# Patient Record
Sex: FEMALE | Race: WHITE | Employment: FULL TIME | URBAN - METROPOLITAN AREA
[De-identification: names, ages, dates, MRNs, and addresses within clinical notes are randomized per-mention and may not be internally consistent; named-entity substitution may affect disease eponyms.]

---

## 2023-09-08 ENCOUNTER — OFFICE VISIT (OUTPATIENT)
Age: 53
End: 2023-09-08
Payer: COMMERCIAL

## 2023-09-08 VITALS
BODY MASS INDEX: 26.44 KG/M2 | HEART RATE: 68 BPM | DIASTOLIC BLOOD PRESSURE: 75 MMHG | OXYGEN SATURATION: 99 % | HEIGHT: 63 IN | WEIGHT: 149.2 LBS | TEMPERATURE: 97 F | RESPIRATION RATE: 18 BRPM | SYSTOLIC BLOOD PRESSURE: 121 MMHG

## 2023-09-08 DIAGNOSIS — J30.1 ALLERGIC RHINITIS DUE TO POLLEN, UNSPECIFIED SEASONALITY: ICD-10-CM

## 2023-09-08 DIAGNOSIS — E78.00 PURE HYPERCHOLESTEROLEMIA: Primary | ICD-10-CM

## 2023-09-08 DIAGNOSIS — Z86.19 H/O COLD SORES: ICD-10-CM

## 2023-09-08 DIAGNOSIS — Z12.11 COLON CANCER SCREENING: ICD-10-CM

## 2023-09-08 DIAGNOSIS — N95.1 MENOPAUSAL SYMPTOMS: ICD-10-CM

## 2023-09-08 PROCEDURE — 99203 OFFICE O/P NEW LOW 30 MIN: CPT | Performed by: INTERNAL MEDICINE

## 2023-09-08 RX ORDER — M-VIT,TX,IRON,MINS/CALC/FOLIC 27MG-0.4MG
1 TABLET ORAL DAILY
COMMUNITY

## 2023-09-08 RX ORDER — VIT C/B6/B5/MAGNESIUM/HERB 173 50-5-6-5MG
CAPSULE ORAL DAILY
COMMUNITY

## 2023-09-08 RX ORDER — VALACYCLOVIR HYDROCHLORIDE 1 G/1
2000 TABLET, FILM COATED ORAL 2 TIMES DAILY PRN
Qty: 24 TABLET | Refills: 0 | Status: SHIPPED | OUTPATIENT
Start: 2023-09-08 | End: 2023-09-18

## 2023-09-08 RX ORDER — FEXOFENADINE HCL 180 MG/1
TABLET ORAL DAILY PRN
COMMUNITY
Start: 2023-09-07

## 2023-09-08 RX ORDER — FLUTICASONE PROPIONATE 50 MCG
1 SPRAY, SUSPENSION (ML) NASAL DAILY
COMMUNITY

## 2023-09-08 RX ORDER — CHOLECALCIFEROL (VITAMIN D3) 125 MCG
500 CAPSULE ORAL DAILY
COMMUNITY

## 2023-09-08 SDOH — HEALTH STABILITY: PHYSICAL HEALTH: ON AVERAGE, HOW MANY DAYS PER WEEK DO YOU ENGAGE IN MODERATE TO STRENUOUS EXERCISE (LIKE A BRISK WALK)?: 7 DAYS

## 2023-09-08 SDOH — HEALTH STABILITY: PHYSICAL HEALTH: ON AVERAGE, HOW MANY MINUTES DO YOU ENGAGE IN EXERCISE AT THIS LEVEL?: 60 MIN

## 2023-09-08 SDOH — ECONOMIC STABILITY: FOOD INSECURITY: WITHIN THE PAST 12 MONTHS, YOU WORRIED THAT YOUR FOOD WOULD RUN OUT BEFORE YOU GOT MONEY TO BUY MORE.: NEVER TRUE

## 2023-09-08 SDOH — ECONOMIC STABILITY: FOOD INSECURITY: WITHIN THE PAST 12 MONTHS, THE FOOD YOU BOUGHT JUST DIDN'T LAST AND YOU DIDN'T HAVE MONEY TO GET MORE.: NEVER TRUE

## 2023-09-08 SDOH — ECONOMIC STABILITY: INCOME INSECURITY: HOW HARD IS IT FOR YOU TO PAY FOR THE VERY BASICS LIKE FOOD, HOUSING, MEDICAL CARE, AND HEATING?: NOT HARD AT ALL

## 2023-09-08 SDOH — ECONOMIC STABILITY: HOUSING INSECURITY
IN THE LAST 12 MONTHS, WAS THERE A TIME WHEN YOU DID NOT HAVE A STEADY PLACE TO SLEEP OR SLEPT IN A SHELTER (INCLUDING NOW)?: NO

## 2023-09-08 ASSESSMENT — PATIENT HEALTH QUESTIONNAIRE - PHQ9
1. LITTLE INTEREST OR PLEASURE IN DOING THINGS: 0
SUM OF ALL RESPONSES TO PHQ QUESTIONS 1-9: 0
2. FEELING DOWN, DEPRESSED OR HOPELESS: 0
SUM OF ALL RESPONSES TO PHQ QUESTIONS 1-9: 0
SUM OF ALL RESPONSES TO PHQ9 QUESTIONS 1 & 2: 0
SUM OF ALL RESPONSES TO PHQ QUESTIONS 1-9: 0
SUM OF ALL RESPONSES TO PHQ QUESTIONS 1-9: 0

## 2023-09-08 ASSESSMENT — ENCOUNTER SYMPTOMS
SHORTNESS OF BREATH: 0
COUGH: 0
ABDOMINAL PAIN: 0

## 2023-09-08 NOTE — ASSESSMENT & PLAN NOTE
4/2022,       HDL 87    TG 71    Focusing on lifestyle measures  Rec CT calcium score to help understand individual cardiac risk

## 2023-09-08 NOTE — PROGRESS NOTES
9/8/2023    Keesha Smith 1970 is a 47y.o. year old female new patient,   here for evaluation of the following chief complaint(s):  Chief Complaint   Patient presents with    New Patient    Establish Care           ASSESSMENT/PLAN:  Below is the assessment and plan developed based on review of pertinent history, physical exam, labs, studies, and medications. 1. Pure hypercholesterolemia  Assessment & Plan:   4/2022,       HDL 87    TG 71    Focusing on lifestyle measures  Rec CT calcium score to help understand individual cardiac risk   Orders:  -     CT CARDIAC CALCIUM SCORING; Future  2. Menopausal symptoms  Assessment & Plan:  Having more hot flashes recently, had IUD removed  Follows with Dr Elisabeth Hoang for gyn  3. Allergic rhinitis due to pollen, unspecified seasonality  Assessment & Plan:   Manages with OTCs, has done immunotherapy in the past  4. Colon cancer screening  -     Cologuard (Fecal DNA Colorectal Cancer Screening)  5. H/O cold sores  Assessment & Plan:   Discussed use of PRN valtrex    Orders:  -     valACYclovir (VALTREX) 1 g tablet; Take 2 tablets by mouth 2 times daily as needed (x1 day for cold sore), Disp-24 tablet, R-0Normal         Return in about 6 months (around 3/8/2024) for annual physical,or sooner as needed. SUBJECTIVE/OBJECTIVE:  New patient to me, here to establish care  Visit spent in discussion of past and current medical conditions, with statuses as documented in Assessment and Plan section         Review of Systems   Constitutional:  Negative for chills and fever. Hot flashes   Respiratory:  Negative for cough and shortness of breath. Cardiovascular:  Negative for chest pain, palpitations and leg swelling. Gastrointestinal:  Negative for abdominal pain. Skin:  Negative for rash. Physical Exam  Constitutional:       General: She is not in acute distress. Appearance: Normal appearance. She is not ill-appearing.    HENT:

## 2023-09-29 ENCOUNTER — HOSPITAL ENCOUNTER (OUTPATIENT)
Age: 53
Discharge: HOME | End: 2023-09-29

## 2023-09-29 DIAGNOSIS — E78.00 PURE HYPERCHOLESTEROLEMIA: ICD-10-CM

## 2023-09-29 PROCEDURE — 75571 CT HRT W/O DYE W/CA TEST: CPT

## 2023-10-23 RX ORDER — FEXOFENADINE HCL 180 MG/1
180 TABLET ORAL DAILY
Qty: 90 TABLET | Refills: 3 | Status: SHIPPED | OUTPATIENT
Start: 2023-10-23

## 2023-10-23 NOTE — TELEPHONE ENCOUNTER
Medication Refill Request    Benton Araiza is requesting a refill of the following medication(s):   Fexofenadine 90 tabs  Please send refill to:     [facility]/pharmacy #[id number]- [place], [address] [facility] [phone]  [address] [address]  Phone: [phone] Fax: [phone]

## 2023-11-13 ENCOUNTER — TELEPHONE (OUTPATIENT)
Age: 53
End: 2023-11-13

## 2023-11-13 NOTE — TELEPHONE ENCOUNTER
Medication Refill Request    Yuleit Jone is requesting a refill of the following medication(s):   fexofenadine (ALLEGRA) 180 MG tablet    Please send 3 month supp    Please send refill to:     [facility]/pharmacy #[id number]- [place], [address] [facility] [phone]  [address] [address]  Phone: [phone] Fax: [phone]

## 2024-02-06 ENCOUNTER — TELEPHONE (OUTPATIENT)
Age: 54
End: 2024-02-06

## 2024-02-06 NOTE — TELEPHONE ENCOUNTER
Medication Refill Request    Zenobia Messer is requesting a refill of the following medication(s):   fexofenadine (ALLEGRA) 180 MG tablet                 Please send refill to:     [facility]/pharmacy #[id number] - [place], [place] - [address] -  [phone] - F [phone]  [address] [address]  Phone: [phone] Fax: [phone]

## 2024-03-29 LAB — NONINV COLON CA DNA+OCC BLD SCRN STL QL: NEGATIVE

## 2024-04-12 ENCOUNTER — OFFICE VISIT (OUTPATIENT)
Age: 54
End: 2024-04-12
Payer: COMMERCIAL

## 2024-04-12 VITALS
HEIGHT: 63 IN | SYSTOLIC BLOOD PRESSURE: 134 MMHG | TEMPERATURE: 97.5 F | HEART RATE: 85 BPM | WEIGHT: 148.4 LBS | OXYGEN SATURATION: 97 % | DIASTOLIC BLOOD PRESSURE: 89 MMHG | RESPIRATION RATE: 18 BRPM | BODY MASS INDEX: 26.29 KG/M2

## 2024-04-12 DIAGNOSIS — J30.1 ALLERGIC RHINITIS DUE TO POLLEN, UNSPECIFIED SEASONALITY: ICD-10-CM

## 2024-04-12 DIAGNOSIS — Z00.00 ROUTINE PHYSICAL EXAMINATION: Primary | ICD-10-CM

## 2024-04-12 DIAGNOSIS — E78.00 PURE HYPERCHOLESTEROLEMIA: ICD-10-CM

## 2024-04-12 DIAGNOSIS — E55.9 VITAMIN D DEFICIENCY: ICD-10-CM

## 2024-04-12 PROCEDURE — 99396 PREV VISIT EST AGE 40-64: CPT | Performed by: INTERNAL MEDICINE

## 2024-04-12 RX ORDER — ALBUTEROL SULFATE 90 UG/1
2 AEROSOL, METERED RESPIRATORY (INHALATION) EVERY 6 HOURS PRN
Qty: 18 G | Refills: 3 | Status: SHIPPED | OUTPATIENT
Start: 2024-04-12

## 2024-04-12 ASSESSMENT — PATIENT HEALTH QUESTIONNAIRE - PHQ9
2. FEELING DOWN, DEPRESSED OR HOPELESS: NOT AT ALL
SUM OF ALL RESPONSES TO PHQ QUESTIONS 1-9: 0
1. LITTLE INTEREST OR PLEASURE IN DOING THINGS: NOT AT ALL
SUM OF ALL RESPONSES TO PHQ9 QUESTIONS 1 & 2: 0
SUM OF ALL RESPONSES TO PHQ QUESTIONS 1-9: 0

## 2024-04-12 ASSESSMENT — ENCOUNTER SYMPTOMS
COUGH: 0
DIARRHEA: 0
VOMITING: 0
EYES NEGATIVE: 1
SHORTNESS OF BREATH: 0
NAUSEA: 0
CONSTIPATION: 0
SORE THROAT: 1
ABDOMINAL PAIN: 0

## 2024-04-12 NOTE — PROGRESS NOTES
4/12/2024    Zenobia Messer is a 53 y.o. female who was seen in clinic today for a full physical.             Assessment & Plan:   Below is the assessment and plan developed based on review of pertinent history, physical exam, labs, studies, and medications.    1. Routine physical examination  -     Lipid Panel; Future  -     Hemoglobin A1C; Future  -     TSH; Future  -     Comprehensive Metabolic Panel; Future  -     CBC with Auto Differential; Future  2. Vitamin D deficiency  -     Vitamin D 25 Hydroxy; Future  3. Pure hypercholesterolemia  Assessment & Plan:  Ct calcium score of 1 2023  Will continue lifestyle management and annual monitoring  4. Allergic rhinitis due to pollen, unspecified seasonality  Assessment & Plan:  Continue allegra, resume flonase regularly  Gets a little allergic asthma, will refill albuterol  Orders:  -     albuterol sulfate HFA (PROVENTIL HFA) 108 (90 Base) MCG/ACT inhaler; Inhale 2 puffs into the lungs every 6 hours as needed for Wheezing, Disp-18 g, R-3Normal   Suspect viral illness now being made worse by spring allergies   Rec treatment as above, add nasal rinse, mucinex, NSAID, pm multisymptom  Check in with me next week if not improving    Return for annual physical,or sooner as needed.      Zenobia is here today for a full physical.      Additional concerns today: feverish earlier in the week, sinus drainage, taking OTC cold and flu meds, sore throat    Diet and exercise habits: balanced diet, working out regularly with a workout group, walks on the weekends      Depression Screen:  PHQ-9 Total Score: 0 (4/12/2024  1:37 PM)       Health Maintenance:     Health Maintenance   Topic Date Due    Diabetes screen  Never done    Lipids  Never done    Shingles vaccine (1 of 2) Never done    COVID-19 Vaccine (4 - 2023-24 season) 09/01/2023    Breast cancer screen  01/06/2024    Polio vaccine (2 of 3 - Adult catch-up series) 01/01/2060 (Originally 9/7/2011)    Flu vaccine (Season Ended)

## 2024-04-12 NOTE — PATIENT INSTRUCTIONS
UPPER RESPIRATORY INFECTIONS    TREATMENT WITH OVER THE COUNTER MEDICINES    Use over-the-counter medications based on your symptoms. Follow all usage and warning information on the label. There is no specific treatment for viral illnesses (including COVID-19) Your body can fight off certain bacterial infections without antibiotics as well (simple sinusitis is one example)  Medications can typically be combined/layered on when you have multiple symptoms, but do not take two meds that have the same ingredient at the same time.    - Stuffy/runny nose - nasal decongestants that contains phenylephrine OK but usually pseudoephedrine (e.g, Sudafed) is more effective, also use saline nasal spray/ saline rinse like Andrea Med or Netti pot type system followed by steroid nasal spray (e.g., Flonase, Nasacort), +/- oral antihistamines (e.g., Claritin or Zyrtec)    - Dry cough (without mucus) - cough drops, cough suppressant that contains dextromethorphan (e.g., Delsym)    - Productive cough (wet cough with mucus) - expectorant that contains guaifenesin (e.g., Robitussin or Mucinex).    - Both productive and dry cough - a combination guaifenesin/dextromethorphan product (e.g., Mucinex DM or Robitussin DM)    - Sore throat - pain reliever such ibuprofen (e.g., Advil or Motrin) is best, or acetaminophen (tylenol), throat sprays like chloraseptic spray, or cough drops    - Fever or headache or body aches - Use a pain reliever such as acetaminophen (Tylenol) or ibuprofen (e.g., Advil or Motrin)    High blood pressure? Avoid or minimize use of medicines containing the ingredients pseudoephedrine, ephedrine, phenylephrine, naphazoline, and oxymetazoline.  Also keep NSAIDs to only short term/minimal use    Glaucoma? Avoid or minimize use of medicine containing antihistamine like benadryl (diphenhydramine) and nasal steroids (fluticasone, triamcinolone sprays)    Seek medical attention immediately if your illness is getting worse (e.g.,

## 2024-04-12 NOTE — PROGRESS NOTES
Chief Complaint   Patient presents with    Annual Exam    Sinusitis     Patient has been having symptoms since 4/8/24     Blood pressure 134/89, pulse 85, temperature 97.5 °F (36.4 °C), temperature source Temporal, resp. rate 18, height 1.603 m (5' 3.1\"), weight 67.3 kg (148 lb 6.4 oz), SpO2 97 %.

## 2024-04-22 ENCOUNTER — E-VISIT (OUTPATIENT)
Age: 54
End: 2024-04-22
Payer: COMMERCIAL

## 2024-04-22 DIAGNOSIS — U07.1 COVID-19: Primary | ICD-10-CM

## 2024-04-22 PROCEDURE — 99421 OL DIG E/M SVC 5-10 MIN: CPT | Performed by: INTERNAL MEDICINE

## 2024-04-22 RX ORDER — BENZONATATE 200 MG/1
200 CAPSULE ORAL 3 TIMES DAILY PRN
Qty: 30 CAPSULE | Refills: 0 | Status: SHIPPED | OUTPATIENT
Start: 2024-04-22 | End: 2024-04-29

## 2024-04-22 ASSESSMENT — LIFESTYLE VARIABLES: SMOKING_STATUS: NO, I HAVE NEVER SMOKED

## 2024-04-22 NOTE — PROGRESS NOTES
E-Visit Note:    HPI: per patient questionnaire, this has been reviewed  EXAM: n/a    ----------------------------------  1. COVID-19  -     nirmatrelvir/ritonavir 300/100 (PAXLOVID) 20 x 150 MG & 10 x 100MG TBPK; Take 3 tablets (two 150 mg nirmatrelvir and one 100 mg ritonavir tablets) by mouth every 12 hours for 5 days., Disp-30 tablet, R-0Normal  -     benzonatate (TESSALON) 200 MG capsule; Take 1 capsule by mouth 3 times daily as needed for Cough, Disp-30 capsule, R-0Normal     Advice per mychart msg  ----------------------------------  The patient was advised to call if these symptoms worsen or fail to improve as anticipated.    5-10 minutes were spent on the digital evaluation and management of this patient.     Lia Ohara MD    negative...

## 2024-04-26 DIAGNOSIS — J30.1 ALLERGIC RHINITIS DUE TO POLLEN, UNSPECIFIED SEASONALITY: ICD-10-CM

## 2024-04-26 RX ORDER — ALBUTEROL SULFATE 90 UG/1
2 AEROSOL, METERED RESPIRATORY (INHALATION) EVERY 6 HOURS PRN
Qty: 18 G | Refills: 5 | Status: SHIPPED | OUTPATIENT
Start: 2024-04-26

## 2024-05-10 ENCOUNTER — NURSE ONLY (OUTPATIENT)
Age: 54
End: 2024-05-10

## 2024-05-10 DIAGNOSIS — Z00.00 ROUTINE PHYSICAL EXAMINATION: ICD-10-CM

## 2024-05-10 DIAGNOSIS — E55.9 VITAMIN D DEFICIENCY: ICD-10-CM

## 2024-05-10 LAB
25(OH)D3 SERPL-MCNC: 42.6 NG/ML (ref 30–100)
ALBUMIN SERPL-MCNC: 3.6 G/DL (ref 3.5–5)
ALBUMIN/GLOB SERPL: 1 (ref 1.1–2.2)
ALP SERPL-CCNC: 83 U/L (ref 45–117)
ALT SERPL-CCNC: 27 U/L (ref 12–78)
ANION GAP SERPL CALC-SCNC: 2 MMOL/L (ref 5–15)
AST SERPL-CCNC: 27 U/L (ref 15–37)
BASOPHILS # BLD: 0 K/UL (ref 0–0.1)
BASOPHILS NFR BLD: 1 % (ref 0–1)
BILIRUB SERPL-MCNC: 0.7 MG/DL (ref 0.2–1)
BUN SERPL-MCNC: 12 MG/DL (ref 6–20)
BUN/CREAT SERPL: 17 (ref 12–20)
CALCIUM SERPL-MCNC: 9.3 MG/DL (ref 8.5–10.1)
CHLORIDE SERPL-SCNC: 109 MMOL/L (ref 97–108)
CHOLEST SERPL-MCNC: 228 MG/DL
CO2 SERPL-SCNC: 28 MMOL/L (ref 21–32)
CREAT SERPL-MCNC: 0.7 MG/DL (ref 0.55–1.02)
DIFFERENTIAL METHOD BLD: NORMAL
EOSINOPHIL # BLD: 0.2 K/UL (ref 0–0.4)
EOSINOPHIL NFR BLD: 4 % (ref 0–7)
ERYTHROCYTE [DISTWIDTH] IN BLOOD BY AUTOMATED COUNT: 13.2 % (ref 11.5–14.5)
EST. AVERAGE GLUCOSE BLD GHB EST-MCNC: 111 MG/DL
GLOBULIN SER CALC-MCNC: 3.6 G/DL (ref 2–4)
GLUCOSE SERPL-MCNC: 85 MG/DL (ref 65–100)
HBA1C MFR BLD: 5.5 % (ref 4–5.6)
HCT VFR BLD AUTO: 41.1 % (ref 35–47)
HDLC SERPL-MCNC: 73 MG/DL
HDLC SERPL: 3.1 (ref 0–5)
HGB BLD-MCNC: 13.7 G/DL (ref 11.5–16)
IMM GRANULOCYTES # BLD AUTO: 0 K/UL (ref 0–0.04)
IMM GRANULOCYTES NFR BLD AUTO: 0 % (ref 0–0.5)
LDLC SERPL CALC-MCNC: 141.4 MG/DL (ref 0–100)
LYMPHOCYTES # BLD: 1.4 K/UL (ref 0.8–3.5)
LYMPHOCYTES NFR BLD: 29 % (ref 12–49)
MCH RBC QN AUTO: 29.8 PG (ref 26–34)
MCHC RBC AUTO-ENTMCNC: 33.3 G/DL (ref 30–36.5)
MCV RBC AUTO: 89.3 FL (ref 80–99)
MONOCYTES # BLD: 0.4 K/UL (ref 0–1)
MONOCYTES NFR BLD: 8 % (ref 5–13)
NEUTS SEG # BLD: 2.8 K/UL (ref 1.8–8)
NEUTS SEG NFR BLD: 58 % (ref 32–75)
NRBC # BLD: 0 K/UL (ref 0–0.01)
NRBC BLD-RTO: 0 PER 100 WBC
PLATELET # BLD AUTO: 219 K/UL (ref 150–400)
PMV BLD AUTO: 11.4 FL (ref 8.9–12.9)
POTASSIUM SERPL-SCNC: 4.4 MMOL/L (ref 3.5–5.1)
PROT SERPL-MCNC: 7.2 G/DL (ref 6.4–8.2)
RBC # BLD AUTO: 4.6 M/UL (ref 3.8–5.2)
SODIUM SERPL-SCNC: 139 MMOL/L (ref 136–145)
TRIGL SERPL-MCNC: 68 MG/DL
TSH SERPL DL<=0.05 MIU/L-ACNC: 1.9 UIU/ML (ref 0.36–3.74)
VLDLC SERPL CALC-MCNC: 13.6 MG/DL
WBC # BLD AUTO: 4.8 K/UL (ref 3.6–11)

## 2025-03-12 ENCOUNTER — TELEPHONE (OUTPATIENT)
Age: 55
End: 2025-03-12

## 2025-03-12 NOTE — TELEPHONE ENCOUNTER
----- Message from Effie LEE sent at 3/11/2025  2:35 PM EDT -----  Regarding: [facility] Referral Request  [facility] Referral Request    Reason for referral request: Lab/Test Order    Specialist/Lab/Test patient is requesting (if known): Blood work    Specialist Phone Number (if applicable):    Additional Information: Patient is requesting an order for her blood work for Annual Physical on 4/25/2025 at 8:30 AM.   --------------------------------------------------------------------------------------------------------------------------    Relationship to Patient: Self     Call Back Information: OK to leave message on voicemail  Preferred Call Back Number: Phone [phone]

## 2025-04-25 ENCOUNTER — OFFICE VISIT (OUTPATIENT)
Age: 55
End: 2025-04-25
Payer: COMMERCIAL

## 2025-04-25 VITALS
TEMPERATURE: 98.3 F | WEIGHT: 135.2 LBS | HEART RATE: 72 BPM | DIASTOLIC BLOOD PRESSURE: 77 MMHG | OXYGEN SATURATION: 97 % | HEIGHT: 63 IN | BODY MASS INDEX: 23.96 KG/M2 | RESPIRATION RATE: 16 BRPM | SYSTOLIC BLOOD PRESSURE: 120 MMHG

## 2025-04-25 DIAGNOSIS — J30.1 ALLERGIC RHINITIS DUE TO POLLEN, UNSPECIFIED SEASONALITY: ICD-10-CM

## 2025-04-25 DIAGNOSIS — E78.00 PURE HYPERCHOLESTEROLEMIA: ICD-10-CM

## 2025-04-25 DIAGNOSIS — Z86.19 H/O COLD SORES: ICD-10-CM

## 2025-04-25 DIAGNOSIS — Z00.00 ROUTINE PHYSICAL EXAMINATION: Primary | ICD-10-CM

## 2025-04-25 DIAGNOSIS — Z00.00 ROUTINE PHYSICAL EXAMINATION: ICD-10-CM

## 2025-04-25 LAB
ALBUMIN SERPL-MCNC: 4 G/DL (ref 3.5–5)
ALBUMIN/GLOB SERPL: 1.3 (ref 1.1–2.2)
ALP SERPL-CCNC: 74 U/L (ref 45–117)
ALT SERPL-CCNC: 22 U/L (ref 12–78)
ANION GAP SERPL CALC-SCNC: 5 MMOL/L (ref 2–12)
AST SERPL-CCNC: 24 U/L (ref 15–37)
BASOPHILS # BLD: 0.07 K/UL (ref 0–0.1)
BASOPHILS NFR BLD: 1.2 % (ref 0–1)
BILIRUB SERPL-MCNC: 0.6 MG/DL (ref 0.2–1)
BUN SERPL-MCNC: 14 MG/DL (ref 6–20)
BUN/CREAT SERPL: 18 (ref 12–20)
CALCIUM SERPL-MCNC: 9.1 MG/DL (ref 8.5–10.1)
CHLORIDE SERPL-SCNC: 105 MMOL/L (ref 97–108)
CHOLEST SERPL-MCNC: 235 MG/DL
CO2 SERPL-SCNC: 29 MMOL/L (ref 21–32)
CREAT SERPL-MCNC: 0.77 MG/DL (ref 0.55–1.02)
DIFFERENTIAL METHOD BLD: ABNORMAL
EOSINOPHIL # BLD: 0.2 K/UL (ref 0–0.4)
EOSINOPHIL NFR BLD: 3.5 % (ref 0–7)
ERYTHROCYTE [DISTWIDTH] IN BLOOD BY AUTOMATED COUNT: 12.6 % (ref 11.5–14.5)
EST. AVERAGE GLUCOSE BLD GHB EST-MCNC: 97 MG/DL
GLOBULIN SER CALC-MCNC: 3.2 G/DL (ref 2–4)
GLUCOSE SERPL-MCNC: 78 MG/DL (ref 65–100)
HBA1C MFR BLD: 5 % (ref 4–5.6)
HCT VFR BLD AUTO: 45.1 % (ref 35–47)
HDLC SERPL-MCNC: 69 MG/DL
HDLC SERPL: 3.4 (ref 0–5)
HGB BLD-MCNC: 14.3 G/DL (ref 11.5–16)
IMM GRANULOCYTES # BLD AUTO: 0.01 K/UL (ref 0–0.04)
IMM GRANULOCYTES NFR BLD AUTO: 0.2 % (ref 0–0.5)
LDLC SERPL CALC-MCNC: 155.8 MG/DL (ref 0–100)
LYMPHOCYTES # BLD: 1.51 K/UL (ref 0.8–3.5)
LYMPHOCYTES NFR BLD: 26.4 % (ref 12–49)
MCH RBC QN AUTO: 30.2 PG (ref 26–34)
MCHC RBC AUTO-ENTMCNC: 31.7 G/DL (ref 30–36.5)
MCV RBC AUTO: 95.3 FL (ref 80–99)
MONOCYTES # BLD: 0.45 K/UL (ref 0–1)
MONOCYTES NFR BLD: 7.9 % (ref 5–13)
NEUTS SEG # BLD: 3.48 K/UL (ref 1.8–8)
NEUTS SEG NFR BLD: 60.8 % (ref 32–75)
NRBC # BLD: 0 K/UL (ref 0–0.01)
NRBC BLD-RTO: 0 PER 100 WBC
PLATELET # BLD AUTO: 262 K/UL (ref 150–400)
PMV BLD AUTO: 11.7 FL (ref 8.9–12.9)
POTASSIUM SERPL-SCNC: 4.4 MMOL/L (ref 3.5–5.1)
PROT SERPL-MCNC: 7.2 G/DL (ref 6.4–8.2)
RBC # BLD AUTO: 4.73 M/UL (ref 3.8–5.2)
SODIUM SERPL-SCNC: 139 MMOL/L (ref 136–145)
TRIGL SERPL-MCNC: 51 MG/DL
TSH SERPL DL<=0.05 MIU/L-ACNC: 1.63 UIU/ML (ref 0.36–3.74)
VLDLC SERPL CALC-MCNC: 10.2 MG/DL
WBC # BLD AUTO: 5.7 K/UL (ref 3.6–11)

## 2025-04-25 PROCEDURE — 99396 PREV VISIT EST AGE 40-64: CPT | Performed by: INTERNAL MEDICINE

## 2025-04-25 RX ORDER — VALACYCLOVIR HYDROCHLORIDE 1 G/1
2000 TABLET, FILM COATED ORAL 2 TIMES DAILY PRN
Qty: 24 TABLET | Refills: 0 | Status: SHIPPED | OUTPATIENT
Start: 2025-04-25 | End: 2025-05-05

## 2025-04-25 RX ORDER — ESTRADIOL AND NORETHINDRONE ACETATE .5; .1 MG/1; MG/1
TABLET ORAL DAILY
COMMUNITY
Start: 2025-04-14

## 2025-04-25 SDOH — ECONOMIC STABILITY: FOOD INSECURITY: WITHIN THE PAST 12 MONTHS, YOU WORRIED THAT YOUR FOOD WOULD RUN OUT BEFORE YOU GOT MONEY TO BUY MORE.: NEVER TRUE

## 2025-04-25 SDOH — ECONOMIC STABILITY: FOOD INSECURITY: WITHIN THE PAST 12 MONTHS, THE FOOD YOU BOUGHT JUST DIDN'T LAST AND YOU DIDN'T HAVE MONEY TO GET MORE.: NEVER TRUE

## 2025-04-25 ASSESSMENT — PATIENT HEALTH QUESTIONNAIRE - PHQ9
1. LITTLE INTEREST OR PLEASURE IN DOING THINGS: NOT AT ALL
2. FEELING DOWN, DEPRESSED OR HOPELESS: NOT AT ALL
SUM OF ALL RESPONSES TO PHQ QUESTIONS 1-9: 0

## 2025-04-25 ASSESSMENT — ENCOUNTER SYMPTOMS
ABDOMINAL PAIN: 0
DIARRHEA: 0
CONSTIPATION: 0
SHORTNESS OF BREATH: 0
COUGH: 0
EYES NEGATIVE: 1
NAUSEA: 0
VOMITING: 0

## 2025-04-25 NOTE — PROGRESS NOTES
4/25/2025    Zenobia Messer is a 54 y.o. female who was seen in clinic today for a full physical.             Assessment & Plan:   Below is the assessment and plan developed based on review of pertinent history, physical exam, labs, studies, and medications.    1. Routine physical examination  -     Lipid Panel; Future  -     Hemoglobin A1C; Future  -     TSH; Future  -     Comprehensive Metabolic Panel; Future  -     CBC with Auto Differential; Future  2. Allergic rhinitis due to pollen, unspecified seasonality  Assessment & Plan:  Continues allegra, flonase regularly  Has seen allergist, Dr ROSS Negro  3. Pure hypercholesterolemia  Assessment & Plan:  Ct calcium score of 1 2023  Will continue lifestyle management and annual monitoring  Orders:  -     Lipid Panel; Future  4. H/O cold sores  -     valACYclovir (VALTREX) 1 g tablet; Take 2 tablets by mouth 2 times daily as needed (x1 day for cold sore), Disp-24 tablet, R-0Normal       Return for annual physical,or sooner as needed.      Zenobia is here today for a full physical.      Diet and exercise habits: working out regularly, cardio and weights, diet overall balanced      Depression Screen:  PHQ-9 Total Score: 0 (4/25/2025  8:39 AM)       Health Maintenance:     Health Maintenance   Topic Date Due    Pneumococcal 50+ years Vaccine (1 of 1 - PCV) Never done    Cervical cancer screen  04/01/2028 (Originally 1/8/2023)    Polio vaccine (2 of 3 - Adult catch-up series) 01/01/2060 (Originally 9/7/2011)    COVID-19 Vaccine (4 - 2024-25 season) 01/01/2060 (Originally 9/1/2024)    Shingles vaccine (2 of 2) 04/26/2025    Depression Screen  04/25/2026    Breast cancer screen  07/01/2026    DTaP/Tdap/Td vaccine (3 - Td or Tdap) 11/15/2026    Colorectal Cancer Screen  03/24/2027    Lipids  05/10/2029    Hepatitis B vaccine  Completed    Flu vaccine  Completed    Hepatitis A vaccine  Aged Out    Hib vaccine  Aged Out    Meningococcal (ACWY) vaccine  Aged Out    Meningococcal B

## 2025-04-28 ENCOUNTER — RESULTS FOLLOW-UP (OUTPATIENT)
Age: 55
End: 2025-04-28